# Patient Record
Sex: FEMALE | Race: WHITE | HISPANIC OR LATINO | Employment: OTHER | URBAN - METROPOLITAN AREA
[De-identification: names, ages, dates, MRNs, and addresses within clinical notes are randomized per-mention and may not be internally consistent; named-entity substitution may affect disease eponyms.]

---

## 2023-08-30 ENCOUNTER — HOSPITAL ENCOUNTER (OUTPATIENT)
Facility: MEDICAL CENTER | Age: 78
End: 2023-08-31
Attending: STUDENT IN AN ORGANIZED HEALTH CARE EDUCATION/TRAINING PROGRAM | Admitting: STUDENT IN AN ORGANIZED HEALTH CARE EDUCATION/TRAINING PROGRAM

## 2023-08-30 ENCOUNTER — APPOINTMENT (OUTPATIENT)
Dept: RADIOLOGY | Facility: MEDICAL CENTER | Age: 78
End: 2023-08-30
Attending: STUDENT IN AN ORGANIZED HEALTH CARE EDUCATION/TRAINING PROGRAM

## 2023-08-30 DIAGNOSIS — R42 DIZZINESS: ICD-10-CM

## 2023-08-30 DIAGNOSIS — R42 VERTIGO: ICD-10-CM

## 2023-08-30 LAB
ALBUMIN SERPL BCP-MCNC: 4.8 G/DL (ref 3.2–4.9)
ALBUMIN/GLOB SERPL: 1.4 G/DL
ALP SERPL-CCNC: 129 U/L (ref 30–99)
ALT SERPL-CCNC: 23 U/L (ref 2–50)
ANION GAP SERPL CALC-SCNC: 12 MMOL/L (ref 7–16)
APPEARANCE UR: CLEAR
AST SERPL-CCNC: 26 U/L (ref 12–45)
BACTERIA #/AREA URNS HPF: NEGATIVE /HPF
BASOPHILS # BLD AUTO: 0.3 % (ref 0–1.8)
BASOPHILS # BLD: 0.03 K/UL (ref 0–0.12)
BILIRUB SERPL-MCNC: 0.2 MG/DL (ref 0.1–1.5)
BILIRUB UR QL STRIP.AUTO: NEGATIVE
BUN SERPL-MCNC: 21 MG/DL (ref 8–22)
CALCIUM ALBUM COR SERPL-MCNC: 9.3 MG/DL (ref 8.5–10.5)
CALCIUM SERPL-MCNC: 9.9 MG/DL (ref 8.5–10.5)
CHLORIDE SERPL-SCNC: 102 MMOL/L (ref 96–112)
CO2 SERPL-SCNC: 22 MMOL/L (ref 20–33)
COLOR UR: YELLOW
CREAT SERPL-MCNC: 1.01 MG/DL (ref 0.5–1.4)
EKG IMPRESSION: NORMAL
EOSINOPHIL # BLD AUTO: 0.58 K/UL (ref 0–0.51)
EOSINOPHIL NFR BLD: 5.1 % (ref 0–6.9)
EPI CELLS #/AREA URNS HPF: NEGATIVE /HPF
ERYTHROCYTE [DISTWIDTH] IN BLOOD BY AUTOMATED COUNT: 43.7 FL (ref 35.9–50)
GFR SERPLBLD CREATININE-BSD FMLA CKD-EPI: 56 ML/MIN/1.73 M 2
GLOBULIN SER CALC-MCNC: 3.5 G/DL (ref 1.9–3.5)
GLUCOSE SERPL-MCNC: 140 MG/DL (ref 65–99)
GLUCOSE UR STRIP.AUTO-MCNC: NEGATIVE MG/DL
HCT VFR BLD AUTO: 44.3 % (ref 37–47)
HGB BLD-MCNC: 14.4 G/DL (ref 12–16)
HYALINE CASTS #/AREA URNS LPF: NORMAL /LPF
IMM GRANULOCYTES # BLD AUTO: 0.04 K/UL (ref 0–0.11)
IMM GRANULOCYTES NFR BLD AUTO: 0.4 % (ref 0–0.9)
KETONES UR STRIP.AUTO-MCNC: NEGATIVE MG/DL
LEUKOCYTE ESTERASE UR QL STRIP.AUTO: ABNORMAL
LYMPHOCYTES # BLD AUTO: 1.97 K/UL (ref 1–4.8)
LYMPHOCYTES NFR BLD: 17.4 % (ref 22–41)
MCH RBC QN AUTO: 29.8 PG (ref 27–33)
MCHC RBC AUTO-ENTMCNC: 32.5 G/DL (ref 32.2–35.5)
MCV RBC AUTO: 91.5 FL (ref 81.4–97.8)
MICRO URNS: ABNORMAL
MONOCYTES # BLD AUTO: 0.53 K/UL (ref 0–0.85)
MONOCYTES NFR BLD AUTO: 4.7 % (ref 0–13.4)
NEUTROPHILS # BLD AUTO: 8.14 K/UL (ref 1.82–7.42)
NEUTROPHILS NFR BLD: 72.1 % (ref 44–72)
NITRITE UR QL STRIP.AUTO: NEGATIVE
NRBC # BLD AUTO: 0 K/UL
NRBC BLD-RTO: 0 /100 WBC (ref 0–0.2)
PH UR STRIP.AUTO: 5.5 [PH] (ref 5–8)
PLATELET # BLD AUTO: 233 K/UL (ref 164–446)
PMV BLD AUTO: 11.1 FL (ref 9–12.9)
POTASSIUM SERPL-SCNC: 3.8 MMOL/L (ref 3.6–5.5)
PROT SERPL-MCNC: 8.3 G/DL (ref 6–8.2)
PROT UR QL STRIP: NEGATIVE MG/DL
RBC # BLD AUTO: 4.84 M/UL (ref 4.2–5.4)
RBC # URNS HPF: NORMAL /HPF
RBC UR QL AUTO: NEGATIVE
SODIUM SERPL-SCNC: 136 MMOL/L (ref 135–145)
SP GR UR STRIP.AUTO: 1.01
TROPONIN T SERPL-MCNC: 9 NG/L (ref 6–19)
UROBILINOGEN UR STRIP.AUTO-MCNC: 0.2 MG/DL
WBC # BLD AUTO: 11.3 K/UL (ref 4.8–10.8)
WBC #/AREA URNS HPF: NORMAL /HPF

## 2023-08-30 PROCEDURE — A9270 NON-COVERED ITEM OR SERVICE: HCPCS | Performed by: STUDENT IN AN ORGANIZED HEALTH CARE EDUCATION/TRAINING PROGRAM

## 2023-08-30 PROCEDURE — 700102 HCHG RX REV CODE 250 W/ 637 OVERRIDE(OP): Performed by: STUDENT IN AN ORGANIZED HEALTH CARE EDUCATION/TRAINING PROGRAM

## 2023-08-30 PROCEDURE — 99222 1ST HOSP IP/OBS MODERATE 55: CPT | Performed by: STUDENT IN AN ORGANIZED HEALTH CARE EDUCATION/TRAINING PROGRAM

## 2023-08-30 PROCEDURE — 84484 ASSAY OF TROPONIN QUANT: CPT

## 2023-08-30 PROCEDURE — 81001 URINALYSIS AUTO W/SCOPE: CPT

## 2023-08-30 PROCEDURE — 93005 ELECTROCARDIOGRAM TRACING: CPT | Performed by: STUDENT IN AN ORGANIZED HEALTH CARE EDUCATION/TRAINING PROGRAM

## 2023-08-30 PROCEDURE — 93005 ELECTROCARDIOGRAM TRACING: CPT

## 2023-08-30 PROCEDURE — 70498 CT ANGIOGRAPHY NECK: CPT

## 2023-08-30 PROCEDURE — 700105 HCHG RX REV CODE 258: Performed by: STUDENT IN AN ORGANIZED HEALTH CARE EDUCATION/TRAINING PROGRAM

## 2023-08-30 PROCEDURE — 700117 HCHG RX CONTRAST REV CODE 255: Performed by: STUDENT IN AN ORGANIZED HEALTH CARE EDUCATION/TRAINING PROGRAM

## 2023-08-30 PROCEDURE — 85025 COMPLETE CBC W/AUTO DIFF WBC: CPT

## 2023-08-30 PROCEDURE — 36415 COLL VENOUS BLD VENIPUNCTURE: CPT

## 2023-08-30 PROCEDURE — 70496 CT ANGIOGRAPHY HEAD: CPT

## 2023-08-30 PROCEDURE — 80053 COMPREHEN METABOLIC PANEL: CPT

## 2023-08-30 PROCEDURE — G0378 HOSPITAL OBSERVATION PER HR: HCPCS

## 2023-08-30 PROCEDURE — 99285 EMERGENCY DEPT VISIT HI MDM: CPT

## 2023-08-30 RX ORDER — SODIUM CHLORIDE 9 MG/ML
1000 INJECTION, SOLUTION INTRAVENOUS ONCE
Status: COMPLETED | OUTPATIENT
Start: 2023-08-30 | End: 2023-08-30

## 2023-08-30 RX ORDER — MECLIZINE HYDROCHLORIDE 25 MG/1
25 TABLET ORAL ONCE
Status: COMPLETED | OUTPATIENT
Start: 2023-08-30 | End: 2023-08-30

## 2023-08-30 RX ORDER — ASPIRIN 325 MG
325 TABLET ORAL ONCE
Status: COMPLETED | OUTPATIENT
Start: 2023-08-31 | End: 2023-08-31

## 2023-08-30 RX ADMIN — MECLIZINE HYDROCHLORIDE 25 MG: 25 TABLET ORAL at 21:06

## 2023-08-30 RX ADMIN — IOHEXOL 65 ML: 350 INJECTION, SOLUTION INTRAVENOUS at 22:00

## 2023-08-30 RX ADMIN — SODIUM CHLORIDE 1000 ML: 9 INJECTION, SOLUTION INTRAVENOUS at 21:06

## 2023-08-31 ENCOUNTER — APPOINTMENT (OUTPATIENT)
Dept: RADIOLOGY | Facility: MEDICAL CENTER | Age: 78
End: 2023-08-31
Attending: STUDENT IN AN ORGANIZED HEALTH CARE EDUCATION/TRAINING PROGRAM

## 2023-08-31 ENCOUNTER — PATIENT OUTREACH (OUTPATIENT)
Dept: SCHEDULING | Facility: IMAGING CENTER | Age: 78
End: 2023-08-31

## 2023-08-31 VITALS
TEMPERATURE: 97.8 F | BODY MASS INDEX: 25.22 KG/M2 | HEIGHT: 61 IN | OXYGEN SATURATION: 96 % | SYSTOLIC BLOOD PRESSURE: 142 MMHG | RESPIRATION RATE: 18 BRPM | HEART RATE: 76 BPM | WEIGHT: 133.6 LBS | DIASTOLIC BLOOD PRESSURE: 69 MMHG

## 2023-08-31 PROBLEM — I10 HYPERTENSION: Status: ACTIVE | Noted: 2023-08-31

## 2023-08-31 PROBLEM — E78.5 HYPERLIPIDEMIA: Status: ACTIVE | Noted: 2023-08-31

## 2023-08-31 PROBLEM — E11.39 TYPE 2 DIABETES MELLITUS WITH OPHTHALMIC COMPLICATION, WITHOUT LONG-TERM CURRENT USE OF INSULIN (HCC): Status: ACTIVE | Noted: 2023-08-31

## 2023-08-31 LAB
CHOLEST SERPL-MCNC: 158 MG/DL (ref 100–199)
EKG IMPRESSION: NORMAL
EST. AVERAGE GLUCOSE BLD GHB EST-MCNC: 154 MG/DL
GLUCOSE BLD STRIP.AUTO-MCNC: 149 MG/DL (ref 65–99)
GLUCOSE BLD STRIP.AUTO-MCNC: 89 MG/DL (ref 65–99)
HBA1C MFR BLD: 7 % (ref 4–5.6)
HDLC SERPL-MCNC: 63 MG/DL
LDLC SERPL CALC-MCNC: 82 MG/DL
TRIGL SERPL-MCNC: 64 MG/DL (ref 0–149)

## 2023-08-31 PROCEDURE — G0378 HOSPITAL OBSERVATION PER HR: HCPCS

## 2023-08-31 PROCEDURE — 82962 GLUCOSE BLOOD TEST: CPT | Mod: 91

## 2023-08-31 PROCEDURE — 700111 HCHG RX REV CODE 636 W/ 250 OVERRIDE (IP): Mod: JZ | Performed by: STUDENT IN AN ORGANIZED HEALTH CARE EDUCATION/TRAINING PROGRAM

## 2023-08-31 PROCEDURE — 700102 HCHG RX REV CODE 250 W/ 637 OVERRIDE(OP): Performed by: STUDENT IN AN ORGANIZED HEALTH CARE EDUCATION/TRAINING PROGRAM

## 2023-08-31 PROCEDURE — 36415 COLL VENOUS BLD VENIPUNCTURE: CPT

## 2023-08-31 PROCEDURE — 70551 MRI BRAIN STEM W/O DYE: CPT

## 2023-08-31 PROCEDURE — A9270 NON-COVERED ITEM OR SERVICE: HCPCS | Performed by: STUDENT IN AN ORGANIZED HEALTH CARE EDUCATION/TRAINING PROGRAM

## 2023-08-31 PROCEDURE — 83036 HEMOGLOBIN GLYCOSYLATED A1C: CPT

## 2023-08-31 PROCEDURE — 96372 THER/PROPH/DIAG INJ SC/IM: CPT

## 2023-08-31 PROCEDURE — 80061 LIPID PANEL: CPT

## 2023-08-31 PROCEDURE — 99239 HOSP IP/OBS DSCHRG MGMT >30: CPT | Performed by: INTERNAL MEDICINE

## 2023-08-31 RX ORDER — POLYETHYLENE GLYCOL 3350 17 G/17G
1 POWDER, FOR SOLUTION ORAL
Status: DISCONTINUED | OUTPATIENT
Start: 2023-08-31 | End: 2023-08-31 | Stop reason: HOSPADM

## 2023-08-31 RX ORDER — ALPRAZOLAM 0.25 MG/1
0.25 TABLET ORAL EVERY EVENING
COMMUNITY

## 2023-08-31 RX ORDER — ATORVASTATIN CALCIUM 20 MG/1
20 TABLET, FILM COATED ORAL EVERY EVENING
COMMUNITY

## 2023-08-31 RX ORDER — PANTOPRAZOLE SODIUM 40 MG/1
40 TABLET, DELAYED RELEASE ORAL
COMMUNITY

## 2023-08-31 RX ORDER — AMOXICILLIN 250 MG
2 CAPSULE ORAL 2 TIMES DAILY
Status: DISCONTINUED | OUTPATIENT
Start: 2023-08-31 | End: 2023-08-31 | Stop reason: HOSPADM

## 2023-08-31 RX ORDER — INSULIN LISPRO 100 [IU]/ML
1-6 INJECTION, SOLUTION INTRAVENOUS; SUBCUTANEOUS
Status: DISCONTINUED | OUTPATIENT
Start: 2023-08-31 | End: 2023-08-31 | Stop reason: HOSPADM

## 2023-08-31 RX ORDER — LABETALOL HYDROCHLORIDE 5 MG/ML
10 INJECTION, SOLUTION INTRAVENOUS EVERY 6 HOURS PRN
Status: DISCONTINUED | OUTPATIENT
Start: 2023-08-31 | End: 2023-08-31 | Stop reason: HOSPADM

## 2023-08-31 RX ORDER — ASPIRIN 81 MG/1
81 TABLET ORAL DAILY
COMMUNITY

## 2023-08-31 RX ORDER — GLYBURIDE-METFORMIN HYDROCHLORIDE 5; 500 MG/1; MG/1
0.5 TABLET ORAL 2 TIMES DAILY WITH MEALS
COMMUNITY

## 2023-08-31 RX ORDER — ATORVASTATIN CALCIUM 80 MG/1
80 TABLET, FILM COATED ORAL EVERY EVENING
Status: DISCONTINUED | OUTPATIENT
Start: 2023-08-31 | End: 2023-08-31 | Stop reason: HOSPADM

## 2023-08-31 RX ORDER — BISACODYL 10 MG
10 SUPPOSITORY, RECTAL RECTAL
Status: DISCONTINUED | OUTPATIENT
Start: 2023-08-31 | End: 2023-08-31 | Stop reason: HOSPADM

## 2023-08-31 RX ORDER — ASPIRIN 81 MG/1
81 TABLET ORAL DAILY
Status: DISCONTINUED | OUTPATIENT
Start: 2023-08-31 | End: 2023-08-31 | Stop reason: HOSPADM

## 2023-08-31 RX ORDER — DEXTROSE MONOHYDRATE 25 G/50ML
25 INJECTION, SOLUTION INTRAVENOUS
Status: DISCONTINUED | OUTPATIENT
Start: 2023-08-31 | End: 2023-08-31 | Stop reason: HOSPADM

## 2023-08-31 RX ORDER — ENOXAPARIN SODIUM 100 MG/ML
40 INJECTION SUBCUTANEOUS DAILY
Status: DISCONTINUED | OUTPATIENT
Start: 2023-08-31 | End: 2023-08-31 | Stop reason: HOSPADM

## 2023-08-31 RX ORDER — LABETALOL HYDROCHLORIDE 5 MG/ML
10 INJECTION, SOLUTION INTRAVENOUS EVERY 6 HOURS PRN
Status: DISCONTINUED | OUTPATIENT
Start: 2023-08-31 | End: 2023-08-31

## 2023-08-31 RX ORDER — ENALAPRIL MALEATE 10 MG/1
10 TABLET ORAL DAILY
COMMUNITY

## 2023-08-31 RX ORDER — ESCITALOPRAM OXALATE 10 MG/1
10 TABLET ORAL DAILY
COMMUNITY

## 2023-08-31 RX ADMIN — ATORVASTATIN CALCIUM 80 MG: 80 TABLET, FILM COATED ORAL at 01:50

## 2023-08-31 RX ADMIN — ENOXAPARIN SODIUM 40 MG: 100 INJECTION SUBCUTANEOUS at 01:55

## 2023-08-31 RX ADMIN — ASPIRIN 325 MG: 325 TABLET ORAL at 00:11

## 2023-08-31 RX ADMIN — SENNOSIDES AND DOCUSATE SODIUM 2 TABLET: 50; 8.6 TABLET ORAL at 05:47

## 2023-08-31 RX ADMIN — ASPIRIN 81 MG: 81 TABLET, COATED ORAL at 05:46

## 2023-08-31 ASSESSMENT — ENCOUNTER SYMPTOMS
HEARTBURN: 0
VOMITING: 0
SEIZURES: 0
TINGLING: 0
DIZZINESS: 1
WEAKNESS: 0
NAUSEA: 0
MYALGIAS: 0
ABDOMINAL PAIN: 0
CHILLS: 0
SORE THROAT: 0
SHORTNESS OF BREATH: 0
EYE PAIN: 0
HEADACHES: 1
FOCAL WEAKNESS: 0
SPEECH CHANGE: 0
FEVER: 0
PALPITATIONS: 0
BLURRED VISION: 1
BACK PAIN: 0
LOSS OF CONSCIOUSNESS: 0
COUGH: 0

## 2023-08-31 ASSESSMENT — PAIN DESCRIPTION - PAIN TYPE
TYPE: ACUTE PAIN
TYPE: ACUTE PAIN

## 2023-08-31 ASSESSMENT — FIBROSIS 4 INDEX: FIB4 SCORE: 1.86

## 2023-08-31 NOTE — ED NOTES
Bedside report given to S1 RN. Patient on telemetry, fall precautions in place. POC discussed with patient. No needs at this time.

## 2023-08-31 NOTE — CARE PLAN
The patient is Stable - Low risk of patient condition declining or worsening    Shift Goals  Clinical Goals: MRI, monitor blood sugars, safety, communication  Patient Goals: MRI, rest  Family Goals: JUSTINA    Progress made toward(s) clinical / shift goals: Assumed care of patient at 0715. Patient is alert and oriented x4. RN used an  to explain the plan of care and discharge plan. RN applied RONDA hose per order. Patient has no further questions about the plan of care. Bed alarm is on and call light is within reach.     Problem: Neuro Status  Goal: Neuro status will remain stable or improve  Outcome: Progressing  Note: Q4 neuro checks in place. Patient is alert and oriented x4, follows commands, and has some difficulty seeing in the right eye. Neuro status remained stable.      Problem: Mobility  Goal: Patient's capacity to carry out activities will improve  Outcome: Progressing  Note: Patient ambulated around the unit and up to the bathroom with nursing staff. Patient is steady.      Problem: Diabetes Management  Goal: Patient will achieve and maintain glucose in satisfactory range  Outcome: Progressing  Note: RN monitored glucose levels ACHS. Patient's blood sugars remained between .      Patient is not progressing towards the following goals: N/A

## 2023-08-31 NOTE — ASSESSMENT & PLAN NOTE
8/31/2023  Patient takes metformin only  Recently told she has diabetic eye disease by ophthalmologist in [place]  Got contrast study today, holding metformin  Not markedly hyperglycemic, correctional insulin if needed  Check A1c

## 2023-08-31 NOTE — DISCHARGE SUMMARY
Discharge Summary    CHIEF COMPLAINT ON ADMISSION  Chief Complaint   Patient presents with    Dizziness     Patient reports dizziness for approx two weeks. Patient states she has some blurred vision in her R eye as well x 2weeks. Patient reports history of cataracts       Reason for Admission  Dizziness     Admission Date  8/30/2023    CODE STATUS  Full Code    HPI & HOSPITAL COURSE  Juju Emmanuel is a 78 y.o. female with a history of diabetic retinopathy, cataracts, diabetes, hypertension, hyperlipidemia who presented on 8/30/2023 with dizziness, headaches.  Reports her dizziness is made it difficult to ambulate.     On presentation patient leukocytosis of 11.3.  A1c of 7.  Blood sugar on .  LDL 82.  Alk phos 129.  Initially she had systolics in the 200s.     CT angiogram of the head and neck showed narrowing of the left P1 segment.     MRI brain showed no acute infarct, chronic microvascular ischemic changes.     Patient ambulated with physical therapy and did well.  Recommended for outpatient physical therapy.     Dizziness likely secondary to hypertensive emergency on presentation.  Blood pressure normalized without any blood pressure medications.  Dizziness also improved.  Counseled about blood pressure and blood sugar control.  Patient wished to do physical therapy when she returns home to [place] next week.     She is medically stable to discharge home.  Follow-up with primary care as outpatient.  Follow-up with outpatient ophthalmology for diabetic retinopathy    Therefore, she is discharged in fair and stable condition to home with close outpatient follow-up.    The patient recovered much more quickly than anticipated on admission.    Discharge Date  8/31/2023    FOLLOW UP ITEMS POST DISCHARGE  None    DISCHARGE DIAGNOSES  Principal Problem:    Vertigo (POA: Yes)  Active Problems:    Hypertension (POA: Yes)    Hyperlipidemia (POA: Yes)    Type 2 diabetes mellitus with ophthalmic complication, without  "long-term current use of insulin (HCC) (POA: Yes)  Resolved Problems:    * No resolved hospital problems. *      FOLLOW UP  Follow-up with primary care as outpatient    MEDICATIONS ON DISCHARGE     Medication List        CONTINUE taking these medications        Instructions   ALPRAZolam 0.25 MG Tabs  Commonly known as: Xanax   Take 0.25 mg by mouth every evening.  Dose: 0.25 mg     aspirin 81 MG EC tablet   Take 81 mg by mouth every day.  Dose: 81 mg     atorvastatin 20 MG Tabs  Commonly known as: Lipitor   Take 20 mg by mouth every evening.  Dose: 20 mg     enalapril 10 MG Tabs  Commonly known as: Vasotec   Take 10 mg by mouth every day.  Dose: 10 mg     escitalopram 10 MG Tabs  Commonly known as: Lexapro   Take 10 mg by mouth every day.  Dose: 10 mg     glyBURIDE-metFORMIN 5-500 MG Tabs  Commonly known as: Glucovance   Take 0.5 Tablets by mouth 2 times a day with meals.  Dose: 0.5 Tablet     Non Formulary Request   Take 1 Capsule by mouth every evening. \"Laxacaps\"  Sennosides 12 mg + Prune Concentrate 50 mg  Indications: Laxative, Stool Softener  Dose: 1 Capsule     pantoprazole 40 MG Tbec  Commonly known as: Protonix   Take 40 mg by mouth 1 time a day as needed (Heartburn).  Dose: 40 mg              Allergies  No Known Allergies    DIET  Orders Placed This Encounter   Procedures    Diet Order Diet: Consistent CHO (Diabetic)     Standing Status:   Standing     Number of Occurrences:   1     Order Specific Question:   Diet:     Answer:   Consistent CHO (Diabetic) [4]       ACTIVITY  As tolerated.  Weight bearing as tolerated    CONSULTATIONS  Discussed with neurology    PROCEDURES  None    LABORATORY  Lab Results   Component Value Date    SODIUM 136 08/30/2023    POTASSIUM 3.8 08/30/2023    CHLORIDE 102 08/30/2023    CO2 22 08/30/2023    GLUCOSE 140 (H) 08/30/2023    BUN 21 08/30/2023    CREATININE 1.01 08/30/2023        Lab Results   Component Value Date    WBC 11.3 (H) 08/30/2023    HEMOGLOBIN 14.4 08/30/2023    " HEMATOCRIT 44.3 08/30/2023    PLATELETCT 233 08/30/2023        I discussed medications and side effects with the patient.  I discussed prognosis and importance of medical compliance with the patient.  I counseled the patient about diet, exercise, weight loss, smoking cessation, and life style modifications.  All questions and concerns have been addressed.  Total time of the discharge process was 37 minutes.

## 2023-08-31 NOTE — ASSESSMENT & PLAN NOTE
8/31/2023  Onset 8/30, accompanied by headache.  CTA showed narrowing of P1 segment, unclear etiology, possible stenosis, occlusion or vasospasm and unclear if cause of symptoms could be.  EDP discussed case with neurology, no intervention, get MRI to rule out stroke.  At time of my evaluation patient has returned to baseline, denies continued vertigo, normal neurologic exam  -MRI brain  -Aspirin, statin  -Lipid panel, A1c  -Neurochecks  -Further management per results

## 2023-08-31 NOTE — ED NOTES
Pt wheeled to restroom, able to transfer with steady gait. Connected to monitor with call light in reach.

## 2023-08-31 NOTE — H&P
Hospital Medicine History & Physical Note    Date of Service  8/30/2023    Primary Care Physician  Pcp Pt States None    Code Status  Full Code    Chief Complaint  Chief Complaint   Patient presents with    Dizziness     Patient reports dizziness for approx two weeks. Patient states she has some blurred vision in her R eye as well x 2weeks. Patient reports history of cataracts       History of Presenting Illness  Juju Emmanuel is a 78 y.o. female who presented 8/30/2023 with dizziness.      services were used in the patient's primary language of Ukrainian.     Name or Number: Juli [id number]  Mode of interpretation: iPad    This is a very pleasant 78-year-old woman from [place] with history of diabetes, hyperlipidemia, hypertension.  She presents due to dizziness.  She reports that she was recently seen by ophthalmologist and [place] and told she had cataracts and likely diabetic eye disease, she reports that her eyes have been feeling tired for a few weeks and blurry/cloudy more in her right eye.  This afternoon around 1 PM she had a headache and began feeling dizzy with sensation of room spinning causing difficulty ambulating.  She has not had this happen before.  In the ED CT head did not show any bleed however CTA showed narrowing of P1 segment which could be occlusion, stenosis or vasospasm, unclear if it is related.  EDP spoke with neurology who recommended observation and MRI brain to rule out stroke.  She was treated with meclizine.  At the time of my evaluation she reports that the dizziness has subsided and is feeling better, she actually preferred to go home however was agreeable to be admitted to rule out stroke.      I discussed the plan of care with patient and family.    Review of Systems  Review of Systems   Constitutional:  Negative for chills and fever.   HENT:  Negative for congestion and sore throat.    Eyes:  Positive for blurred vision. Negative for pain.   Respiratory:  Negative for  cough and shortness of breath.    Cardiovascular:  Negative for chest pain and palpitations.   Gastrointestinal:  Negative for abdominal pain, heartburn, nausea and vomiting.   Genitourinary:  Negative for dysuria and urgency.   Musculoskeletal:  Negative for back pain and myalgias.   Neurological:  Positive for dizziness and headaches. Negative for tingling, speech change, focal weakness, seizures, loss of consciousness and weakness.       Past Medical History   has no past medical history on file.    Surgical History   has no past surgical history on file.     Family History  Unsure if has had family history of stroke  Family history reviewed with patient. There is no family history that is pertinent to the chief complaint.     Social History   reports that she has never smoked. She has never used smokeless tobacco. She reports that she does not drink alcohol and does not use drugs.    Allergies  No Known Allergies    Medications  None       Physical Exam  Temp:  [36.2 °C (97.2 °F)] 36.2 °C (97.2 °F)  Pulse:  [58-91] 71  Resp:  [16-20] 17  BP: (145-204)/(65-91) 145/71  SpO2:  [96 %-98 %] 98 %  Blood Pressure : (!) 180/78   Temperature: 36.2 °C (97.2 °F)   Pulse: (!) 58   Respiration: 18   Pulse Oximetry: 97 %       Physical Exam  Constitutional:       General: She is not in acute distress.     Appearance: She is not ill-appearing.   HENT:      Head: Normocephalic and atraumatic.      Nose: Nose normal.      Mouth/Throat:      Mouth: Mucous membranes are dry.      Pharynx: Oropharynx is clear.   Eyes:      Extraocular Movements: Extraocular movements intact.      Conjunctiva/sclera: Conjunctivae normal.   Cardiovascular:      Rate and Rhythm: Normal rate and regular rhythm.      Pulses: Normal pulses.      Heart sounds: Normal heart sounds.   Pulmonary:      Effort: Pulmonary effort is normal.      Breath sounds: Normal breath sounds.   Abdominal:      General: Abdomen is flat.      Palpations: Abdomen is soft.  "  Musculoskeletal:         General: No swelling or deformity.      Cervical back: Normal range of motion and neck supple.   Skin:     General: Skin is warm and dry.      Capillary Refill: Capillary refill takes less than 2 seconds.   Neurological:      General: No focal deficit present.      Mental Status: She is oriented to person, place, and time.      Cranial Nerves: No cranial nerve deficit.      Sensory: No sensory deficit.      Motor: No weakness.      Comments: NIH stroke scale 0, no cerebellar signs on exam, no nystagmus         Laboratory:  Recent Labs     08/30/23 2045   WBC 11.3*   RBC 4.84   HEMOGLOBIN 14.4   HEMATOCRIT 44.3   MCV 91.5   MCH 29.8   MCHC 32.5   RDW 43.7   PLATELETCT 233   MPV 11.1     Recent Labs     08/30/23 2045   SODIUM 136   POTASSIUM 3.8   CHLORIDE 102   CO2 22   GLUCOSE 140*   BUN 21   CREATININE 1.01   CALCIUM 9.9     Recent Labs     08/30/23 2045   ALTSGPT 23   ASTSGOT 26   ALKPHOSPHAT 129*   TBILIRUBIN 0.2   GLUCOSE 140*         No results for input(s): \"NTPROBNP\" in the last 72 hours.  Recent Labs     08/31/23  0204   TRIGLYCERIDE 64   HDL 63   LDL 82     Recent Labs     08/30/23 2045   TROPONINT 9       Imaging:  CT-CTA NECK WITH & W/O-POST PROCESSING   Final Result         1.  CT angiogram of the neck within normal limits.         CT-CTA HEAD WITH & W/O-POST PROCESS   Final Result         1.  Narrowing of the left P1 segment, compatible with partial occlusion, stenosis, versus vasospasm.   2.  No large vessel occlusion or aneurysm identified.      These findings were discussed with the patient's clinician, Severiano Quinn, on 8/30/2023 11:24 PM.      MR-BRAIN-W/O    (Results Pending)       EKG:  I have personally reviewed the images and compared with prior images. and My impression is: Sinus rhythm, QTc 464, no ST changes    Assessment/Plan:  Justification for Admission Status  I anticipate this patient is appropriate for observation status at this time because acute " neurological change, need to rule out stroke, obtain MRI, neurochecks    Patient will need a Telemetry bed on NEUROLOGY service .  The need is secondary to above.    * Vertigo- (present on admission)  Assessment & Plan  Onset 8/30, accompanied by headache.  CTA showed narrowing of P1 segment, unclear etiology, possible stenosis, occlusion or vasospasm and unclear if cause of symptoms could be.  EDP discussed case with neurology, no intervention, get MRI to rule out stroke.  At time of my evaluation patient has returned to baseline, denies continued vertigo, normal neurologic exam  -MRI brain  -Aspirin, statin  -Lipid panel, A1c  -Neurochecks  -Further management per results    Type 2 diabetes mellitus with ophthalmic complication, without long-term current use of insulin (HCC)  Assessment & Plan  Patient takes metformin only  Recently told she has diabetic eye disease by ophthalmologist in [place]  Got contrast study today, holding metformin  Not markedly hyperglycemic, correctional insulin if needed  Check A1c    Hyperlipidemia  Assessment & Plan  Patient takes atorvastatin, increasing to high intensity dose in setting of possible stroke  Check lipid panel    Hypertension  Assessment & Plan  Patient has a history of hypertension and is fairly hypertensive in the ED, lower BP gradually and restart pts HTN meds when confirmed with pharmacy. Per EDP neuro no permissive HTN, as needed labetalol          VTE prophylaxis: SCDs/TEDs and enoxaparin ppx

## 2023-08-31 NOTE — PROGRESS NOTES
Provided discharge education and instruction using an . Removed all IV's and patient has all her belongings. Patient escorted with nursing staff via wheelchair to her daughter's car.

## 2023-08-31 NOTE — ASSESSMENT & PLAN NOTE
8/31/2023  Patient has a history of hypertension and is fairly hypertensive in the ED, lower BP gradually and restart pts HTN meds when confirmed with pharmacy. Per EDP neuro no permissive HTN, as needed labetalol

## 2023-08-31 NOTE — ASSESSMENT & PLAN NOTE
8/31/2023  Patient takes atorvastatin, increasing to high intensity dose in setting of possible stroke  Check lipid panel

## 2023-08-31 NOTE — ED PROVIDER NOTES
"ED Provider Note    CHIEF COMPLAINT  Chief Complaint   Patient presents with    Dizziness     Patient reports dizziness for approx two weeks. Patient states she has some blurred vision in her R eye as well x 2weeks. Patient reports history of cataracts       EXTERNAL RECORDS REVIEWED  Other none available    HPI/ROS  LIMITATION TO HISTORY   Tamazight,  used    OUTSIDE HISTORIAN(S):  Family daughter at bedside    Juju Emmanuel is a 78 y.o. female who presents to the emergency department for evaluation of dizziness.  Symptoms have been intermittent for the last 2 weeks but got worse last night.  Patient had a severe headache, nausea and vomiting last night as well.  Headache nausea and vomiting resolved today but the patient's dizziness got worse at 3:00.  She reports that his room spinning.  She denies any vision change, numbness, tingling, weakness.  She does report difficulty walking.  She denies chest pain or pressure, neck pain or pressure, recent falls or injuries.  She has never had something like this before.    PAST MEDICAL HISTORY       SURGICAL HISTORY  patient denies any surgical history    FAMILY HISTORY  History reviewed. No pertinent family history.    SOCIAL HISTORY  Social History     Tobacco Use    Smoking status: Never    Smokeless tobacco: Never   Substance and Sexual Activity    Alcohol use: Never    Drug use: Never    Sexual activity: Not on file       CURRENT MEDICATIONS  Home Medications       Reviewed by Clari Mariscal R.N. (Registered Nurse) on 08/30/23 at 1884  Med List Status: Partial     Medication Last Dose Status        Patient Demarcus Taking any Medications                           ALLERGIES  No Known Allergies    PHYSICAL EXAM  VITAL SIGNS: BP (!) 174/65 Comment: rn notified  Pulse 76   Temp 36.2 °C (97.2 °F) (Temporal)   Resp 20   Ht 1.549 m (5' 1\")   Wt 60.6 kg (133 lb 9.6 oz)   SpO2 96%   BMI 25.24 kg/m²    Constitutional: No acute distress  HEENT: Atraumatic, " normocephalic, pupils are equal round reactive to light, nose normal, mouth shows moist mucous membranes  Neck: Supple, no JVD, no tracheal deviation  Cardiovascular: Regular rate and rhythm, no murmur, rub or gallop, 2+ pulses peripherally x4  Thorax & Lungs: No respiratory distress, no wheezes, rales or rhonchi, no chest wall tenderness.  GI: Soft, non-distended, non-tender, no rebound  Skin: Warm, dry, no acute rash or lesion  Musculoskeletal: Moving all extremities, no acute deformity, no edema, no tenderness  Neurologic: A&Ox3, at baseline mentation, cranial nerves II through XII are intact, 5 out of 5 strength and normal sensation of upper and lower extremities bilaterally.  Normal finger-nose-finger.  No pronator drift.  Ataxic gait.  Psychiatric: Appropriate affect for situation at this time      DIAGNOSTIC STUDIES / PROCEDURES  EKG  I have independently interpreted this EKG  Results for orders placed or performed during the hospital encounter of 23   EKG   Result Value Ref Range    Report       [facility] Emergency Dept.    Test Date:  2023  Pt Name:    ELYSE MCKENZIE                  Department: ER  MRN:        [id number]                      Room:  Gender:     Female                       Technician: [id number]  :        1945                   Requested By:ER TRIAGE PROTOCOL  Order #:    [id number]                    Reading MD:    Measurements  Intervals                                Axis  Rate:       84                           P:          56  NC:         144                          QRS:        92  QRSD:       100                          T:          30  QT:         401  QTc:        475    Interpretive Statements  Sinus rhythm  Consider right ventricular hypertrophy  Minimal ST elevation, lateral leads  Baseline wander in lead(s) I,III,aVR,aVL,aVF,V5,V6  No previous ECG available for comparison     EKG   Result Value Ref Range    Report       [facility]  [place] Emergency Dept.    Test Date:  2023  Pt Name:    ELYSE MCKENZIE                  Department: ER  MRN:        [id number]                      Room:       S196  Gender:     Female                       Technician: [id number]  :        1945                   Requested By:ER TRIAGE PROTOCOL  Order #:    [id number]                    Reading MD: Severiano Quinn    Measurements  Intervals                                Axis  Rate:       75                           P:          80  NM:         138                          QRS:        94  QRSD:       111                          T:          38  QT:         415  QTc:        464    Interpretive Statements  EKG interpretation: Sinus rhythm at a rate of 75, normal axis, first-degree  AV block, incomplete right bundle branch block, no ST elevation or  depression, no T wave inversion.  No change from prior EKG.  Impression no  evidence of acute ischemia.  Electronically Signed On 2023 01:39:41 PDT by Severiano hylton           LABS  Labs Reviewed   CBC WITH DIFFERENTIAL - Abnormal; Notable for the following components:       Result Value    WBC 11.3 (*)     Neutrophils-Polys 72.10 (*)     Lymphocytes 17.40 (*)     Neutrophils (Absolute) 8.14 (*)     Eos (Absolute) 0.58 (*)     All other components within normal limits    Narrative:     Biotin intake of greater than 5 mg per day may interfere with  troponin levels, causing false low values.   COMP METABOLIC PANEL - Abnormal; Notable for the following components:    Glucose 140 (*)     Alkaline Phosphatase 129 (*)     Total Protein 8.3 (*)     All other components within normal limits    Narrative:     Biotin intake of greater than 5 mg per day may interfere with  troponin levels, causing false low values.   URINALYSIS,CULTURE IF INDICATED - Abnormal; Notable for the following components:    Leukocyte Esterase Trace (*)     All other components within normal limits    Narrative:     Indication for culture:->Patient  WITHOUT an indwelling Ruby  catheter in place with new onset of Dysuria, Frequency,  Urgency, and/or Suprapubic pain  Release to patient->Immediate   ESTIMATED GFR - Abnormal; Notable for the following components:    GFR (CKD-EPI) 56 (*)     All other components within normal limits    Narrative:     Biotin intake of greater than 5 mg per day may interfere with  troponin levels, causing false low values.   TROPONIN    Narrative:     Biotin intake of greater than 5 mg per day may interfere with  troponin levels, causing false low values.   URINE MICROSCOPIC (W/UA)    Narrative:     Indication for culture:->Patient WITHOUT an indwelling Ruby  catheter in place with new onset of Dysuria, Frequency,  Urgency, and/or Suprapubic pain  Release to patient->Immediate   LIPID PROFILE   HEMOGLOBIN A1C         RADIOLOGY  I have independently interpreted the diagnostic imaging associated with this visit and am waiting the final reading from the radiologist.   My preliminary interpretation is as follows: No intracranial hemorrhage    Radiologist interpretation:   CT-CTA NECK WITH & W/O-POST PROCESSING   Final Result         1.  CT angiogram of the neck within normal limits.         CT-CTA HEAD WITH & W/O-POST PROCESS   Final Result         1.  Narrowing of the left P1 segment, compatible with partial occlusion, stenosis, versus vasospasm.   2.  No large vessel occlusion or aneurysm identified.      These findings were discussed with the patient's clinician, Severiano Quinn, on 8/30/2023 11:24 PM.      MR-BRAIN-W/O    (Results Pending)         COURSE & MEDICAL DECISION MAKING    ED Observation Status? Yes; I am placing the patient in to an observation status due to a diagnostic uncertainty as well as therapeutic intensity. Patient placed in observation status at 8:34 PM, 8/30/2023.     Observation plan is as follows: Lab work, CT scan, symptom control and reevaluation.    Upon Reevaluation, the patient's condition has: not  improved; and will be escalated to hospitalization.    Patient discharged from ED Observation status at 11:51 PM (Time) 8/30/2023 (Date).     INITIAL ASSESSMENT, COURSE AND PLAN  Care Narrative:     Patient is a 78-year-old female with a history of diabetes, hypertension presenting for evaluation of room spinning dizziness.  Concerning history with headache and nausea and vomiting preceding onset of dizziness.  No additional neurologic deficits appreciated though is ataxic of gait.  Patient is far outside the stroke window.  Plan for CT imaging of the brain and vasculature to assess for central cause of vertigo.  Will obtain basic labs as well.  We will treat symptomatically with meclizine.    Patient's work-up remarkable for labs significant for slight leukocytosis, slight hyperglycemia, otherwise largely unremarkable with normal troponin and urinalysis.  No additional electrolyte abnormality, liver injury, kidney injury.  CT scan interestingly did demonstrate narrowing of the left P1 segment of the basilar artery concerning for occlusion stenosis or vasospasm.  Neurology consulted and case was discussed with Dr. Gamble who recommends MRI, aspirin initiation and admission for further stroke evaluation.  He will follow along.    Case discussed with admitting hospitalist Dr. Conn who admit the patient to the hospital.    HYDRATION: Based on the patient's presentation of Inability to take oral fluids the patient was given IV fluids. IV Hydration was used because oral hydration was not adequate alone. Upon recheck following hydration, the patient was improved hydration.      ADDITIONAL PROBLEM LIST  Diabetes mellitus    DISPOSITION AND DISCUSSIONS  I have discussed management of the patient with the following physicians and VANESSA's: Dr. Gamble Neurology and Dr. Conn, Hospitalist    Discussion of management with other [facility] or appropriate source(s): None     FINAL DIAGNOSIS  1. Dizziness           Electronically  signed by: Severiano Quinn M.D., 8/30/2023 8:10 PM

## 2023-08-31 NOTE — ED NOTES
Bedside report received from Jignesh BRICE. Patient on cardiac monitor and infusing 1L NS. POC discussed with patient. No needs at this time.

## 2023-08-31 NOTE — ED TRIAGE NOTES
"Chief Complaint   Patient presents with    Dizziness     Patient reports dizziness for approx two weeks. Patient states she has some blurred vision in her R eye as well x 2weeks. Patient reports history of cataracts       77 yo female to triage for above complaint.   EKG complete.    Pt is alert and oriented, speaking in full sentences, follows commands and responds appropriately to questions.     Patient placed back in lobby and educated on triage process. Asked to inform RN of any changes.    BP (!) 198/91   Pulse 91   Temp 36.2 °C (97.2 °F) (Temporal)   Resp 16   Ht 1.549 m (5' 1\")   Wt 62 kg (136 lb 11 oz)   SpO2 97%   BMI 25.83 kg/m²     "

## 2023-08-31 NOTE — PROGRESS NOTES
Med rec completed per patient at bedside with medication boxes from [place] (reviewed and returned to patient belongings).  Sage ([id number]) translated for patient.  Allergies reviewed with patient. NKDA.  No outpatient antibiotics within the last 30 days.

## 2023-08-31 NOTE — ED NOTES
Pt wheeled to [facility]. [facility] process explain to the patient. They understand and verbalize agreement. Up for ERP.

## 2023-08-31 NOTE — DISCHARGE INSTRUCTIONS
Discharge Instructions per Dr. Gallo Martino D.O.    DIET: Diet Order Diet: Consistent CHO (Diabetic)    ACTIVITY: As tolerated    A proper diet that is low in grease, fat, and salt, along with 30 minutes of exercise per day will lead to weight loss, and better controlled blood sugar and blood pressure.    DIAGNOSIS: Vertigo    Follow up with your Primary Care Provider Pcp Pt States None as scheduled or sooner if your symptoms persist or worsen.  Return to Emergency Room for sever chest pain, shortness of breath, signs of a stroke, or any other emergencies.  Discharge Instructions    Discharged to home by car with relative. Discharged via wheelchair, hospital escort: Yes.  Special equipment needed: Not Applicable    Be sure to schedule a follow-up appointment with your primary care doctor or any specialists as instructed.     Discharge Plan:   Diet Plan: Discussed  Activity Level: Discussed  Confirmed Follow up Appointment: Patient to Call and Schedule Appointment  Confirmed Symptoms Management: Discussed  Medication Reconciliation Updated: Yes    I understand that a diet low in cholesterol, fat, and sodium is recommended for good health. Unless I have been given specific instructions below for another diet, I accept this instruction as my diet prescription.   Other diet: Consistent CHO (diabetic)    Special Instructions: None    -Is this patient being discharged with medication to prevent blood clots?  Yes, Aspirin Aspirin Tablets  What is this medication?  ASPIRIN (AS pir in) lowers the risk of heart attack, stroke, or blood clot. It may also be used to treat mild to moderate pain, inflammation, or arthritis. It belongs to a group of medications called NSAIDs.  This medicine may be used for other purposes; ask your health care provider or pharmacist if you have questions.  COMMON BRAND NAME(S): Aspir-Low, Aspir-Loree, Aspirtab, Overhead.fm Advanced Aspirin, Cassandra Aspirin, Overhead.fm Aspirin Extra Strength, Overhead.fm Aspirin  Plus, Cassandra Extra Strength, Cassandra Extra Strength Plus, Cassandra Genuine Aspirin, Cassandra Womens Aspirin, Bufferin, Bufferin Extra Strength, Bufferin Low Dose  What should I tell my care team before I take this medication?  They need to know if you have any of these conditions:  Anemia  Asthma  Bleeding problems  Diabetes  Gout  History of stomach ulcers or bleeding  If you often drink alcohol  Kidney disease  Liver disease  Low level of vitamin K  Lupus  Smoke tobacco  An unusual or allergic reaction to aspirin, tartrazine dye, other medications, dyes, or preservatives  Pregnant or trying to get pregnant  Breast-feeding  How should I use this medication?  Take this medication by mouth with a glass of water. Follow the directions on the package or prescription label. You can take this medication with or without food. If it upsets your stomach, take it with food. Do not take it more often than directed.  Talk to your care team about the use of this medication in children. While this medication may be prescribed for children as young as 12 years of age for selected conditions, precautions do apply. Children and teenagers should not use this medication to treat chicken pox or flu symptoms unless directed by a care team.  Patients over 65 years old may have a stronger reaction and need a smaller dose.  Overdosage: If you think you have taken too much of this medicine contact a poison control center or emergency room at once.  NOTE: This medicine is only for you. Do not share this medicine with others.  What if I miss a dose?  If you are taking this medication on a regular schedule and miss a dose, take it as soon as you can. If it is almost time for your next dose, take only that dose. Do not take double or extra doses.  What may interact with this medication?  Do not take this medication with any of the following:  Cidofovir  Ketorolac  Probenecid  This medication may also interact with the  following:  Alcohol  Alendronate  Bismuth subsalicylate  Flavocoxid  Herbal supplements like feverfew, garlic, yue, ginkgo biloba, horse chestnut  Medications for diabetes or glaucoma like acetazolamide, methazolamide  Medications for gout  Medications that prevent or treat blood clots like apixaban, clopidogrel, enoxaparin, heparin, rivaroxaban, warfarin  Other aspirin and aspirin-like medications  NSAIDs, medications for pain and inflammation, like ibuprofen or naproxen  Pemetrexed  Sulfinpyrazone  Varicella live vaccine  This list may not describe all possible interactions. Give your health care provider a list of all the medicines, herbs, non-prescription drugs, or dietary supplements you use. Also tell them if you smoke, drink alcohol, or use illegal drugs. Some items may interact with your medicine.  What should I watch for while using this medication?  If you are treating yourself for pain, tell your doctor or health care provider if the pain lasts more than 10 days, if it gets worse, or if there is a new or different kind of pain. Tell your doctor if you see redness or swelling. Also, check with your doctor if you have a fever that lasts for more than 3 days. Only take this medication to prevent heart attacks or blood clotting if prescribed by your doctor or health care provider.  Do not take other medications that contain aspirin, ibuprofen, or naproxen with this medication. Side effects such as stomach upset, nausea, or ulcers may be more likely to occur. Many non-prescription medications contain aspirin, ibuprofen, or naproxen. Always read labels carefully.  This medication can cause serious ulcers and bleeding in the stomach. It can happen with no warning. Smoking, drinking alcohol, older age, and poor health can also increase risks. Call your health care provider right away if you have stomach pain or blood in your vomit or stool.  Alcohol may interfere with the effect of this medication. Avoid  alcoholic drinks.  This medication may cause serious skin reactions. They can happen weeks to months after starting the medication. Contact your health care provider right away if you notice fevers or flu-like symptoms with a rash. The rash may be red or purple and then turn into blisters or peeling of the skin. Or, you might notice a red rash with swelling of the face, lips or lymph nodes in your neck or under your arms.  Talk to your health care provider if you are pregnant before taking this medication. Taking this medication between weeks 20 and 30 of pregnancy may harm your unborn baby. Your health care provider will monitor you closely if you need to take it. After 30 weeks of pregnancy, do not take this medication.  Be careful brushing or flossing your teeth or using a toothpick because you may get an infection or bleed more easily. If you have any dental work done, tell your dentist you are receiving this medication.  This medication may make it more difficult to get pregnant. Talk to your health care provider if you are concerned about your fertility.  What side effects may I notice from receiving this medication?  Side effects that you should report to your care team as soon as possible:  Allergic reactions--skin rash, itching, hives, swelling of the face, lips, tongue, or throat  Bleeding--bloody or black, tar-like stools, vomiting blood or brown material that looks like coffee grounds, red or dark brown urine, red or purple spots on skin, unusual bruising or bleeding  Hearing loss, ringing in ears  Kidney injury--decrease in the amount of urine, swelling of the ankles, hands, or feet  Liver injury--right upper belly pain, loss of appetite, nausea, light-colored stool, dark yellow or brown urine, yellowing of the skin or eyes, unusual weakness, fatigue  Rash, fever, and swollen lymph nodes  Side effects that usually do not require medical attention (report to your care team if they continue or are  bothersome):  Headache  Loss of appetite  Nausea  Upset stomach  This list may not describe all possible side effects. Call your doctor for medical advice about side effects. You may report side effects to FDA at 9-057-COT-6912.  Where should I keep my medication?  Keep out of the reach of children and pets.  Store at room temperature between 15 and 30 degrees C (59 and 86 degrees F). Protect from heat and moisture. Get rid of any unused medication after the expiration date.  Do not use this medication if it has a strong vinegar smell.  To get rid of medications that are no longer needed or have :  Take the medication to a medication take-back program. Check with your pharmacy or law enforcement to find a location.  If you cannot return the medication, check the label or package insert to see if the medication should be thrown out in the garbage or flushed down the toilet. If you are not sure, ask your care team. If it is safe to put it in the trash, empty the medication out of the container. Mix the medication with cat litter, dirt, coffee grounds, or other unwanted substance. Seal the mixture in a bag or container. Put it in the trash.  NOTE: This sheet is a summary. It may not cover all possible information. If you have questions about this medicine, talk to your doctor, pharmacist, or health care provider.  ©  [employer]vier/Gold Standard (2021-10-29 00:00:00)    Is patient discharged on Warfarin / Coumadin?   No

## 2023-08-31 NOTE — PROGRESS NOTES
4 Eyes Skin Assessment Completed by YUNIEL Portillo and YUNIEL Montero.    Head WDL  Ears WDL  Nose WDL  Mouth WDL  Neck WDL  Breast/Chest WDL  Shoulder Blades WDL  Spine Old Surgical Incision  (R) Arm/Elbow/Hand WDL  (L) Arm/Elbow/Hand WDL  Abdomen WDL  Groin WDL  Scrotum/Coccyx/Buttocks WDL  (R) Leg Small Raised Indentation Below Knee  (L) Leg WDL  (R) Heel/Foot/Toe WDL  (L) Heel/Foot/Toe WDL          Devices In Places Tele Box and Pulse Ox      Interventions In Place Pillows and Pressure Redistribution Mattress    Possible Skin Injury No    Pictures Uploaded Into Epic N/A  Wound Consult Placed N/A  RN Wound Prevention Protocol Ordered No

## 2023-08-31 NOTE — CARE PLAN
The patient is Stable - Low risk of patient condition declining or worsening    Shift Goals  Clinical Goals: Stable neuro status, safety  Patient Goals: Sleep  Family Goals: JUSTINA    Progress made toward(s) clinical / shift goals:    Problem: Neuro Status  Goal: Neuro status will remain stable or improve  Outcome: Progressing   Q4hr neuro checks. Neuro status remains stable.    Problem: Mobility  Goal: Patient's capacity to carry out activities will improve  Outcome: Progressing   Patient ambulates short distances, reports decrease in dizziness.   Problem: Knowledge Deficit - Standard  Goal: Patient and family/care givers will demonstrate understanding of plan of care, disease process/condition, diagnostic tests and medications  Outcome: Progressing   The patient received education reinforcement regarding plan of care, condition, and medications.      Patient is not progressing towards the following goals: